# Patient Record
Sex: FEMALE | Race: BLACK OR AFRICAN AMERICAN | NOT HISPANIC OR LATINO | Employment: UNEMPLOYED | URBAN - METROPOLITAN AREA
[De-identification: names, ages, dates, MRNs, and addresses within clinical notes are randomized per-mention and may not be internally consistent; named-entity substitution may affect disease eponyms.]

---

## 2022-12-08 ENCOUNTER — VIRTUAL VISIT (OUTPATIENT)
Dept: FAMILY MEDICINE | Facility: CLINIC | Age: 37
End: 2022-12-08
Payer: COMMERCIAL

## 2022-12-08 DIAGNOSIS — G89.29 CHRONIC RIGHT-SIDED THORACIC BACK PAIN: Primary | ICD-10-CM

## 2022-12-08 DIAGNOSIS — M54.6 CHRONIC RIGHT-SIDED THORACIC BACK PAIN: Primary | ICD-10-CM

## 2022-12-08 PROBLEM — M54.2 CERVICALGIA: Status: ACTIVE | Noted: 2021-09-20

## 2022-12-08 PROBLEM — G44.209 MIXED COMMON MIGRAINE AND MUSCLE CONTRACTION HEADACHE: Status: ACTIVE | Noted: 2019-10-23

## 2022-12-08 PROBLEM — E56.9 VITAMIN DEFICIENCY: Status: ACTIVE | Noted: 2021-09-20

## 2022-12-08 PROBLEM — M27.9 DISORDER OF JAW: Status: ACTIVE | Noted: 2021-09-20

## 2022-12-08 PROBLEM — R73.09 ELEVATED HEMOGLOBIN A1C: Status: ACTIVE | Noted: 2021-07-20

## 2022-12-08 PROBLEM — K21.9 GASTROESOPHAGEAL REFLUX DISEASE: Status: ACTIVE | Noted: 2017-12-27

## 2022-12-08 PROBLEM — G43.019 MIGRAINE WITHOUT AURA, INTRACTABLE, WITHOUT STATUS MIGRAINOSUS: Status: ACTIVE | Noted: 2017-09-18

## 2022-12-08 PROBLEM — M47.812 CERVICAL SPONDYLOSIS: Status: ACTIVE | Noted: 2022-08-01

## 2022-12-08 PROBLEM — G43.009 MIXED COMMON MIGRAINE AND MUSCLE CONTRACTION HEADACHE: Status: ACTIVE | Noted: 2019-10-23

## 2022-12-08 PROBLEM — E03.8 OTHER SPECIFIED HYPOTHYROIDISM: Status: ACTIVE | Noted: 2021-01-05

## 2022-12-08 PROBLEM — I67.1 INTRACRANIAL ANEURYSM: Status: ACTIVE | Noted: 2017-09-18

## 2022-12-08 PROCEDURE — 99203 OFFICE O/P NEW LOW 30 MIN: CPT | Mod: 95 | Performed by: NURSE PRACTITIONER

## 2022-12-08 RX ORDER — GABAPENTIN 100 MG/1
300 CAPSULE ORAL
COMMUNITY
Start: 2022-08-01

## 2022-12-08 RX ORDER — ACETAMINOPHEN 325 MG/1
325-650 TABLET ORAL
COMMUNITY

## 2022-12-08 RX ORDER — CYCLOBENZAPRINE HCL 5 MG
5 TABLET ORAL
COMMUNITY
Start: 2022-08-01

## 2022-12-08 RX ORDER — UBIDECARENONE 100 MG
100 CAPSULE ORAL
COMMUNITY

## 2022-12-08 RX ORDER — HYDROCORTISONE 2.5 %
CREAM (GRAM) TOPICAL
COMMUNITY
Start: 2021-07-26

## 2022-12-08 RX ORDER — NORTRIPTYLINE HCL 10 MG
10 CAPSULE ORAL
COMMUNITY
Start: 2022-10-10 | End: 2023-10-10

## 2022-12-08 RX ORDER — SODIUM FLUORIDE 5 MG/G
GEL, DENTIFRICE DENTAL
COMMUNITY
Start: 2021-07-01

## 2022-12-08 RX ORDER — ESTRADIOL 0.1 MG/G
CREAM VAGINAL
COMMUNITY
Start: 2021-07-23

## 2022-12-08 NOTE — PROGRESS NOTES
Liam is a 37 year old who is being evaluated via a billable telephone visit.      What phone number would you like to be contacted at? [phone]   How would you like to obtain your AVS? Mail    Chronic right-sided thoracic back pain  I apologize that unfortunately I was unable to see her in clinic today as this was scheduled as a virtual appointment.  I have differentials include myofascial pain, bulging or herniated disc.  Will refer to spine clinic for further evaluation and treatment.  She is already taking cyclobenzaprine and gabapentin.  She tried a Medrol Dosepak with no relief.  Recommend over-the-counter acetaminophen or ibuprofen as needed for pain.  I encouraged close follow-up with her PCP if symptoms persist or worsen.  She is content with the plan.  - Spine  Referral        Subjective   Liam is a 37 year old{presenting for the following health issues:  Dizziness and Abdominal Pain (Right side)  Patient presents today with concerns of right-sided thoracic back pain.  She is quite frustrated today that this is a telephone visit.  Patient wants to be seen in person.  Unfortunately, she lives on the other side of the [facility] and this was scheduled as a virtual appointment.  She describes the pain as a constant ache within her right lower scapular region.  Pain is worse at bedtime.  This is been waking her up at night.  She tries to sleep on her side with no relief.  Moving objects exacerbates the pain.  She does have a small child that she carries.  Occasionally, she has numbness and tingling within her feet.  She denies numbness and tingling in her upper extremities.  No rash has been present.  Patient has a history of cervical spondylosis and is prescribed cyclobenzaprine and gabapentin.  She has tried a Medrol Dosepak for her current symptoms with no relief.  Patient has seen other healthcare providers.  She occasionally takes Tylenol.      Review of Systems   Constitutional, HEENT,  cardiovascular, pulmonary, gi and gu systems are negative, except as otherwise noted.      Objective           Vitals:  No vitals were obtained today due to virtual visit.    Physical Exam   healthy, alert and no distress  PSYCH: Alert and oriented times 3; coherent speech, normal   rate and volume, able to articulate logical thoughts, able   to abstract reason, no tangential thoughts, no hallucinations   or delusions  Her affect is anxious and angry  RESP: No cough, no audible wheezing, able to talk in full sentences  Remainder of exam unable to be completed due to telephone visits          Phone call duration: 11 minutes

## 2023-01-04 ENCOUNTER — OFFICE VISIT (OUTPATIENT)
Dept: NEUROSURGERY | Facility: CLINIC | Age: 38
End: 2023-01-04
Attending: NURSE PRACTITIONER
Payer: COMMERCIAL

## 2023-01-04 VITALS
HEIGHT: 64 IN | HEART RATE: 75 BPM | SYSTOLIC BLOOD PRESSURE: 103 MMHG | WEIGHT: 142 LBS | BODY MASS INDEX: 24.24 KG/M2 | DIASTOLIC BLOOD PRESSURE: 67 MMHG | OXYGEN SATURATION: 97 %

## 2023-01-04 DIAGNOSIS — M53.3 CHRONIC SI JOINT PAIN: ICD-10-CM

## 2023-01-04 DIAGNOSIS — R20.0 RIGHT LEG NUMBNESS: ICD-10-CM

## 2023-01-04 DIAGNOSIS — G89.29 CHRONIC SI JOINT PAIN: ICD-10-CM

## 2023-01-04 DIAGNOSIS — G89.29 CHRONIC RIGHT-SIDED THORACIC BACK PAIN: ICD-10-CM

## 2023-01-04 DIAGNOSIS — G89.29 CHRONIC BILATERAL LOW BACK PAIN WITH RIGHT-SIDED SCIATICA: Primary | ICD-10-CM

## 2023-01-04 DIAGNOSIS — M54.6 CHRONIC RIGHT-SIDED THORACIC BACK PAIN: ICD-10-CM

## 2023-01-04 DIAGNOSIS — M54.41 CHRONIC BILATERAL LOW BACK PAIN WITH RIGHT-SIDED SCIATICA: Primary | ICD-10-CM

## 2023-01-04 PROCEDURE — 99243 OFF/OP CNSLTJ NEW/EST LOW 30: CPT | Performed by: NURSE PRACTITIONER

## 2023-01-04 ASSESSMENT — PAIN SCALES - GENERAL: PAINLEVEL: SEVERE PAIN (7)

## 2023-01-04 NOTE — LETTER
1/4/2023         RE: Liam Cintron  [address]  [place] [address]        Dear Colleague,    Thank you for referring your patient, Liam Cintron, to the [facility] NEUROLOGY [facility]INICS [facility]. Please see a copy of my visit note below.    [facility] Neurosurgery  Neurosurgery Clinic Visit      CC: mid and low back pain with right leg pain and paresthesias.     Primary care Provider: No Ref-Primary, Physician    Reason For Visit:   I was asked by Wanda Camara CNP to consult on the patient for chronic right-sided thoracic back pain.    HPI: Liam Cintron is a 38 year old female with a history of chronic mid and low back pain who presents for a second opinion. She describes right-sided mid back pain just below her shoulder blade. She also reports bilateral low back pain that intermittently radiates to her right ankle/foot with right ankle/foot paresthesias. She reports generalized weakness. No overt weakness. No bowel/bladder complaints. She has not had any recent treatments. She has had full spine MRIs less than a year ago which were negative.     No past medical history on file.    Past Medical History reviewed with patient during visit.    No past surgical history on file.  Past Surgical History reviewed with patient during visit.    Current Outpatient Medications   Medication     acetaminophen (TYLENOL) 325 MG tablet     cholecalciferol 25 MCG (1000 UT) TABS     co-enzyme Q-10 100 MG CAPS capsule     cyclobenzaprine (FLEXERIL) 5 MG tablet     estradiol (ESTRACE) 0.1 MG/GM vaginal cream     gabapentin (NEURONTIN) 100 MG capsule     hydrocortisone 2.5 % cream     nortriptyline (PAMELOR) 10 MG capsule     omeprazole (PRILOSEC) 20 MG DR capsule     sodium fluoride dental gel (PREVIDENT) 1.1 % GEL topical gel     No current facility-administered medications for this visit.       No Known Allergies    Social History     Socioeconomic History     Marital status: Single       No family  "history on file.      ROS: 10 point ROS neg other than the symptoms noted above in the HPI.    Vital Signs:   /67   Pulse 75   Ht 5' 4\" (1.626 m)   Wt 142 lb (64.4 kg)   SpO2 97%   BMI 24.37 kg/m        Examination:  Constitutional:  Alert, well nourished, NAD.  Memory: recent and remote memory   HEENT: Normocephalic, atraumatic.   Pulm:  Without shortness of breath   CV:  No pitting edema of BLE.      Neurological:  Awake  Alert  Oriented x 3  Speech clear    Motor exam:   Hip Flexion:                 Right: 5/5  Left:  5/5  Hip Abduction:             Right:  5/5  Left:  5/5  Hip Adduction:             Right:  5/5  Left:  5/5  Plantar Flexion:           Right:  5/5  Left:  5/5  Dorsal Flexion:            Right:  5/5  Left:  5/5  EHL:                            Right:  5/5  Left:  5/5     Sensation normal to bilateral upper and lower extremities  Muscle tone to bilateral upper and lower extremities   Gait: Able to stand from a seated position. Normal non-antalgic, non-myelopathic gait.  Able to heel/toe walk without loss of balance    Thoracic and lumbar examination reveals mild tenderness of the paraspinous muscles.  Hip height is symmetrical. Positive bilateral SI joint. Negative sciatic notch or greater trochanteric tenderness to palpation bilaterally.  Straight leg raise is negative bilaterally.      Imaging:   Cervical MRI 2/28/2022  IMPRESSION:     1. No acute fracture or spondylolisthesis.   2. No significant spinal canal stenosis or neural foraminal narrowing.     Thoracic MRI 2/28/2022  IMPRESSION:     1. No acute fracture or spondylolisthesis.   2. Tiny disc bulges at T5-6 and T7-8 contact the ventral spinal cord without significant spinal canal stenosis.     Lumbar MRI 2/28/2022  IMPRESSION:     1. No acute fracture or spondylolisthesis.   2. No significant spinal canal stenosis or neural foraminal narrowing.     Assessment/Plan:   Chronic right-sided mid back pain  Bilateral low back pain with " right-sided sciatica  Right leg paresthesias  Chronic bilateral SI joint pain    Reviewed prior imaging. Would recommend PT at this time. Discuss bilateral SI joint injection for diagnostic/therapeutic purposes. She would like to hold off on that at this time. Will follow up in 6 weeks if symptoms persist/worsen and would likely recommend MRI imaging at that time. She verbalized understanding and agreement.    Patient Instructions   -Physical therapy ordered. They will contact you to schedule.  -Follow up in 6 weeks if symptoms persist or worsen.  -Please contact our clinic with questions or concerns at [phone].      Jeaneth Dodge CNP  [facility] Neurosurgery  [address] [place] [id number]  Tel [phone]  Fax [phone]        Again, thank you for allowing me to participate in the care of your patient.        Sincerely,        Jeaneth Dodge, NP

## 2023-01-04 NOTE — PATIENT INSTRUCTIONS
-Physical therapy ordered. They will contact you to schedule.  -Follow up in 6 weeks if symptoms persist or worsen.  -Please contact our clinic with questions or concerns at [phone].

## 2023-01-04 NOTE — NURSING NOTE
"Liam Cintron is a 38 year old female who presents for:  Chief Complaint   Patient presents with     Consult     Chronic right side Thoracic  pain, some pain in both feet, right side mostly        Initial Vitals:  /67   Pulse 75   Ht 5' 4\" (1.626 m)   Wt 142 lb (64.4 kg)   SpO2 97%   BMI 24.37 kg/m   Estimated body mass index is 24.37 kg/m  as calculated from the following:    Height as of this encounter: 5' 4\" (1.626 m).    Weight as of this encounter: 142 lb (64.4 kg).. Body surface area is 1.71 meters squared. BP completed using cuff size: small regular  Severe Pain (7)        Ama Hayes  "

## 2023-01-04 NOTE — PROGRESS NOTES
"[facility] Neurosurgery  Neurosurgery Clinic Visit      CC: mid and low back pain with right leg pain and paresthesias.     Primary care Provider: No Ref-Primary, Physician    Reason For Visit:   I was asked by Wanda Camara CNP to consult on the patient for chronic right-sided thoracic back pain.    HPI: Liam Cintron is a 38 year old female with a history of chronic mid and low back pain who presents for a second opinion. She describes right-sided mid back pain just below her shoulder blade. She also reports bilateral low back pain that intermittently radiates to her right ankle/foot with right ankle/foot paresthesias. She reports generalized weakness. No overt weakness. No bowel/bladder complaints. She has not had any recent treatments. She has had full spine MRIs less than a year ago which were negative.     No past medical history on file.    Past Medical History reviewed with patient during visit.    No past surgical history on file.  Past Surgical History reviewed with patient during visit.    Current Outpatient Medications   Medication     acetaminophen (TYLENOL) 325 MG tablet     cholecalciferol 25 MCG (1000 UT) TABS     co-enzyme Q-10 100 MG CAPS capsule     cyclobenzaprine (FLEXERIL) 5 MG tablet     estradiol (ESTRACE) 0.1 MG/GM vaginal cream     gabapentin (NEURONTIN) 100 MG capsule     hydrocortisone 2.5 % cream     nortriptyline (PAMELOR) 10 MG capsule     omeprazole (PRILOSEC) 20 MG DR capsule     sodium fluoride dental gel (PREVIDENT) 1.1 % GEL topical gel     No current facility-administered medications for this visit.       No Known Allergies    Social History     Socioeconomic History     Marital status: Single       No family history on file.      ROS: 10 point ROS neg other than the symptoms noted above in the HPI.    Vital Signs:   /67   Pulse 75   Ht 5' 4\" (1.626 m)   Wt 142 lb (64.4 kg)   SpO2 97%   BMI 24.37 kg/m        Examination:  Constitutional:  Alert, well nourished, " NAD.  Memory: recent and remote memory   HEENT: Normocephalic, atraumatic.   Pulm:  Without shortness of breath   CV:  No pitting edema of BLE.      Neurological:  Awake  Alert  Oriented x 3  Speech clear    Motor exam:   Hip Flexion:                 Right: 5/5  Left:  5/5  Hip Abduction:             Right:  5/5  Left:  5/5  Hip Adduction:             Right:  5/5  Left:  5/5  Plantar Flexion:           Right:  5/5  Left:  5/5  Dorsal Flexion:            Right:  5/5  Left:  5/5  EHL:                            Right:  5/5  Left:  5/5     Sensation normal to bilateral upper and lower extremities  Muscle tone to bilateral upper and lower extremities   Gait: Able to stand from a seated position. Normal non-antalgic, non-myelopathic gait.  Able to heel/toe walk without loss of balance    Thoracic and lumbar examination reveals mild tenderness of the paraspinous muscles.  Hip height is symmetrical. Positive bilateral SI joint. Negative sciatic notch or greater trochanteric tenderness to palpation bilaterally.  Straight leg raise is negative bilaterally.      Imaging:   Cervical MRI 2/28/2022  IMPRESSION:     1. No acute fracture or spondylolisthesis.   2. No significant spinal canal stenosis or neural foraminal narrowing.     Thoracic MRI 2/28/2022  IMPRESSION:     1. No acute fracture or spondylolisthesis.   2. Tiny disc bulges at T5-6 and T7-8 contact the ventral spinal cord without significant spinal canal stenosis.     Lumbar MRI 2/28/2022  IMPRESSION:     1. No acute fracture or spondylolisthesis.   2. No significant spinal canal stenosis or neural foraminal narrowing.     Assessment/Plan:   Chronic right-sided mid back pain  Bilateral low back pain with right-sided sciatica  Right leg paresthesias  Chronic bilateral SI joint pain    Reviewed prior imaging. Would recommend PT at this time. Discuss bilateral SI joint injection for diagnostic/therapeutic purposes. She would like to hold off on that at this time. Will  follow up in 6 weeks if symptoms persist/worsen and would likely recommend MRI imaging at that time. She verbalized understanding and agreement.    Patient Instructions   -Physical therapy ordered. They will contact you to schedule.  -Follow up in 6 weeks if symptoms persist or worsen.  -Please contact our clinic with questions or concerns at [phone].      Jeaneth Dodge, [facility] Neurosurgery  [address] [place] [id number]  Tel [phone]  Fax [phone]

## 2023-01-16 ENCOUNTER — THERAPY VISIT (OUTPATIENT)
Dept: PHYSICAL THERAPY | Facility: CLINIC | Age: 38
End: 2023-01-16
Attending: NURSE PRACTITIONER
Payer: COMMERCIAL

## 2023-01-16 DIAGNOSIS — G89.29 CHRONIC BILATERAL LOW BACK PAIN WITH BILATERAL SCIATICA: ICD-10-CM

## 2023-01-16 DIAGNOSIS — M54.41 CHRONIC BILATERAL LOW BACK PAIN WITH BILATERAL SCIATICA: ICD-10-CM

## 2023-01-16 DIAGNOSIS — G89.29 CHRONIC SI JOINT PAIN: ICD-10-CM

## 2023-01-16 DIAGNOSIS — M53.3 CHRONIC SI JOINT PAIN: ICD-10-CM

## 2023-01-16 DIAGNOSIS — G89.29 CHRONIC BILATERAL LOW BACK PAIN WITH RIGHT-SIDED SCIATICA: ICD-10-CM

## 2023-01-16 DIAGNOSIS — M54.41 CHRONIC BILATERAL LOW BACK PAIN WITH RIGHT-SIDED SCIATICA: ICD-10-CM

## 2023-01-16 DIAGNOSIS — R20.0 RIGHT LEG NUMBNESS: ICD-10-CM

## 2023-01-16 DIAGNOSIS — M54.42 CHRONIC BILATERAL LOW BACK PAIN WITH BILATERAL SCIATICA: ICD-10-CM

## 2023-01-16 DIAGNOSIS — M54.6 CHRONIC RIGHT-SIDED THORACIC BACK PAIN: ICD-10-CM

## 2023-01-16 DIAGNOSIS — G89.29 CHRONIC RIGHT-SIDED THORACIC BACK PAIN: ICD-10-CM

## 2023-01-16 PROCEDURE — 97110 THERAPEUTIC EXERCISES: CPT | Mod: GP | Performed by: PHYSICAL THERAPIST

## 2023-01-16 PROCEDURE — 97162 PT EVAL MOD COMPLEX 30 MIN: CPT | Mod: GP | Performed by: PHYSICAL THERAPIST

## 2023-01-16 NOTE — PROGRESS NOTES
[facility]    OUTPATIENT Physical Therapy ORTHOPEDIC EVALUATION  PLAN OF TREATMENT FOR OUTPATIENT REHABILITATION  (COMPLETE FOR INITIAL CLAIMS ONLY)  Patient's Last Name, First Name, M.I.  YOB: 1985  Liam Cintron    Provider s Name:  [facility]   Medical Record No.  [id number]   Start of Care Date:  01/16/23   Onset Date:   12/06/22   Treatment Diagnosis:  LBP w/B LE sciatica Medical Diagnosis:     Chronic right-sided thoracic back pain  Chronic bilateral low back pain with right-sided sciatica  Right leg numbness  Chronic SI joint pain  Chronic bilateral low back pain with bilateral sciatica       Goals:     01/16/23 0500   Body Part   Goals listed below are for LBP w/R>L sciatica   Goal #1   Goal #1 self cares/transfers/bed mobility   Previous Functional Level No restrictions   Current Functional Level to transfer in and out of a bed   Performance Level 9/10 concordant LBP   STG Target Performance Be able to ; transfer in and out of a bed   Performance Level 4-5/10 concordant LBP   Rationale for independent self care such as dressing, personal hygiene, bathing;for independent community transportation;for independent living   Due Date 02/06/23   LTG Target Performance Be able to ; transfer in and out of a bed   Performance level 0-2/10 concorandant LBP   Rationale independence in self cares;for independent living;for independent community transportation   Due Date 03/13/23         Therapy Frequency:  1x/week  Predicted Duration of Therapy Intervention:  6 weeks    Jeaneth Arreola PT                 I CERTIFY THE NEED FOR THESE SERVICES FURNISHED UNDER        THIS PLAN OF TREATMENT AND WHILE UNDER MY CARE     (Physician attestation of this document indicates review and certification of the therapy plan).                     Certification Date From:  01/16/23    Certification Date To:  04/15/23    Referring Provider:  Jeaneth Dodge    Initial Assessment        See Epic Evaluation SOC Date: 01/16/23

## 2023-01-16 NOTE — PROGRESS NOTES
Physical Therapy Initial Evaluation  Subjective:  Pt has a 20 month old baby.  Pt has had chronic neck/B shoulder pain for 10 years - it is still there.    The history is provided by the patient. No  was used.   Patient Health History  Liam Cintron being seen for back pain.     Problem began: 12/16/2022.   Problem occurred: insiduous   Pain is reported as 7/10 on pain scale.  General health as reported by patient is good.  Pertinent medical history includes: none.   Red flags:  None as reported by patient.  Medical allergies: none.   Surgeries include:  None.    Current medications:  None.    Current occupation is none.                     Therapist Generated HPI Evaluation         Type of problem:  Lumbar.    This is a chronic condition.  Condition occurred with:  Insidious onset.  Where condition occurred: for unknown reasons.  Patient reports pain:  Lumbar spine right and lumbar spine left.  Pain is described as sharp and shooting and is intermittent.  Pain radiates to:  Foot right, gluteals left, thigh right, lower leg right, gluteals right, thigh left and lower leg left (B anterior pelvis/groin, R heel/toes/arch numbness/tingling intermittentl.  Pain goes down entire leg (R>L). - intermittently). Pain is worse in the P.M., worse during the night and worse in the A.M..  Since onset symptoms are gradually worsening (originally just on the R side of mid and low back - but pain was primarily only at night with sidesleeping).  Associated symptoms:  Loss of motion/stiffness (stiffness/tightness in R leg). Symptoms are exacerbated by lying down, sitting, standing, lifting and walking (transfers - very hard to go from sit to stand and walk because bent over for a whil)  Relieved by: not taking any pain meds for the past 2 weeks.  Special tests included:  MRI (pt reports that she has a disc bulge in her lumbar spine).    Barriers include:  None as reported by patient.                         Objective:  System    Physical Exam      [facility] Lumbar Evaluation    Posture:  Sitting: fair  Standing: fair  Lordosis: Accentuated  Lateral Shift: no  Correction of Posture: better    Movement Loss:  Flexion (Flex): min  Extension (EXT): major and pain  Side Tillson R (SG R): min  Side Glide L (SG L): min  Test Movements:      MAC: During: increases  After: no worse  Mechanical Response: no effect  Repeat MAC: During: increases  After: no worse  Mechanical Response: no effect  EIL: During: abolishes  After: worse and peripheralizing  Mechanical Response: DecrROM  Repeat EIL: During: abolishes  After: worse and peripheralizing  Mechanical Response: DecrROM        Conclusion: other  Principle of Treatment:  Posture Correction: recommended use of lumbar roll whenever sitting  Flexion: trial of supine repeated flexion in lying x 10-15 repetitions, 3-4 times/day                                             ROS    Assessment/Plan:    Patient is a 38 year old female with lumbar complaints.    Patient has the following significant findings with corresponding treatment plan.                Diagnosis 1:  LBP w/B sciatica (L>R)  Pain -  directional preference exercise and home program  Decreased ROM/flexibility - manual therapy, therapeutic exercise and home program  Decreased function - therapeutic activities and home program  Impaired posture - neuro re-education and home program    Therapy Evaluation Codes:   1) History comprised of:   Personal factors that impact the plan of care:      Time since onset of symptoms.    Comorbidity factors that impact the plan of care are:      Numbness/tingling and Pain at night/rest.     Medications impacting care: none.  2) Examination of Body Systems comprised of:   Body structures and functions that impact the plan of care:      Cervical spine and Lumbar spine.   Activity limitations that impact the plan of care are:      Lifting, Sitting, Standing, Walking and  Sleeping.  3) Clinical presentation characteristics are:   Evolving/Changing.  4) Decision-Making    High complexity using standardized patient assessment instrument and/or measureable assessment of functional outcome.  Cumulative Therapy Evaluation is: Moderate complexity.    Previous and current functional limitations:  (See Goal Flow Sheet for this information)    Short term and Long term goals: (See Goal Flow Sheet for this information)     Communication ability:  Patient appears to be able to clearly communicate and understand verbal and written communication and follow directions correctly.  Treatment Explanation - The following has been discussed with the patient:   RX ordered/plan of care  Anticipated outcomes  Possible risks and side effects  This patient would benefit from PT intervention to resume normal activities.   Rehab potential is good.    Frequency:  1 X week, once daily  Duration:  for 8 weeks  Discharge Plan:  Achieve all LTG.  Independent in home treatment program.  Reach maximal therapeutic benefit.    Please refer to the daily flowsheet for treatment today, total treatment time and time spent performing 1:1 timed codes.

## 2023-01-23 ENCOUNTER — THERAPY VISIT (OUTPATIENT)
Dept: PHYSICAL THERAPY | Facility: CLINIC | Age: 38
End: 2023-01-23
Attending: NURSE PRACTITIONER
Payer: COMMERCIAL

## 2023-01-23 DIAGNOSIS — G89.29 CHRONIC BILATERAL LOW BACK PAIN WITH BILATERAL SCIATICA: Primary | ICD-10-CM

## 2023-01-23 DIAGNOSIS — M54.42 CHRONIC BILATERAL LOW BACK PAIN WITH BILATERAL SCIATICA: Primary | ICD-10-CM

## 2023-01-23 DIAGNOSIS — M54.41 CHRONIC BILATERAL LOW BACK PAIN WITH BILATERAL SCIATICA: Primary | ICD-10-CM

## 2023-01-23 PROCEDURE — 97110 THERAPEUTIC EXERCISES: CPT | Mod: GP | Performed by: PHYSICAL THERAPIST

## 2023-01-30 ENCOUNTER — THERAPY VISIT (OUTPATIENT)
Dept: PHYSICAL THERAPY | Facility: CLINIC | Age: 38
End: 2023-01-30
Attending: NURSE PRACTITIONER
Payer: COMMERCIAL

## 2023-01-30 DIAGNOSIS — G89.29 CHRONIC BILATERAL LOW BACK PAIN WITH BILATERAL SCIATICA: Primary | ICD-10-CM

## 2023-01-30 DIAGNOSIS — M54.41 CHRONIC BILATERAL LOW BACK PAIN WITH BILATERAL SCIATICA: Primary | ICD-10-CM

## 2023-01-30 DIAGNOSIS — M54.42 CHRONIC BILATERAL LOW BACK PAIN WITH BILATERAL SCIATICA: Primary | ICD-10-CM

## 2023-01-30 PROCEDURE — 97112 NEUROMUSCULAR REEDUCATION: CPT | Mod: GP | Performed by: PHYSICAL THERAPIST

## 2023-01-30 PROCEDURE — 97110 THERAPEUTIC EXERCISES: CPT | Mod: GP | Performed by: PHYSICAL THERAPIST

## 2023-01-30 NOTE — PROGRESS NOTES
PROGRESS  REPORT    Progress reporting period is from 1/16/2023  to 1/30/2023.       SUBJECTIVE  Subjective changes noted by patient:  Pt reports that she still has a lof of pain with bending and with going from sit to stand.  She gets pain in her lateral R leg and at R glut.  She still has constant numbness/tingling at R heel/toes (gets it much more intermittently on L side).  The pain is still a 9/10 when trying to get out of bed in the morning.  Overall, no change in her symptoms/function.  Changes in function:  None  Adverse reaction to treatment or activity: activity - transitional movements - getting out of bed in the morning and going from sit to stand after prolonged sitting.    OBJECTIVE  Changes noted in objective findings:  Yes, worsening of L5 myotomal strength for R LE.  Objective: Current pain is mild at R lower back/glut and R lateral leg - no numbness in heel/foot.  MMT R DF 5/5, R great to 3/5,difficulty with going up on toes to do toe walking and with DL toe raises.  After supine lumbar flexion in lying stretch, decreased lumbar pain initially, but overall no change upone standing.  Prone positioning and prone lumbar extension stretches produce peripheralized pain into R LE and some numbness/tingling into foot/toes.  lumbar flexion/rotation to the right has NE on pain.  In summary, unable to find a directional preference to try to improve  radicular symptoms.  Worsening.of L5 myotomal strength and difficulty with doing DL toe raises today.     ASSESSMENT/PLAN  Updated problem list and treatment plan: Diagnosis 1:  LBP   Pain -  directional preference exercise and home program  Decreased ROM/flexibility - therapeutic exercise and home program  Impaired muscle performance - neuro re-education and home program  Decreased function - therapeutic activities and home program  Impaired posture - neuro re-education and home program  STG/LTGs have been met or progress has been made towards goals:   None  Assessment of Progress: No change in function and possible worsening of myotomal strength in R LE.  Self Management Plans:  Patient has been instructed in a home treatment program.  I have re-evaluated this patient and find that the nature, scope, duration and intensity of the therapy is appropriate for the medical condition of the patient.  Liam continues to require the following intervention to meet STG and LTG's:  Referring pt back to MD>    Recommendations:  This patient would benefit from further evaluation.    Please refer to the daily flowsheet for treatment today, total treatment time and time spent performing 1:1 timed codes.

## 2023-01-31 ENCOUNTER — TELEPHONE (OUTPATIENT)
Dept: NEUROSURGERY | Facility: CLINIC | Age: 38
End: 2023-01-31
Payer: COMMERCIAL

## 2023-01-31 NOTE — TELEPHONE ENCOUNTER
Patient called wanting to see Jeaneth Dodge- she was referred back to the clinic from the physical therapist as therapy is not working.  Provider not available in [facility] until end of March. Patient stated that was too long to wait.

## 2023-02-10 ENCOUNTER — OFFICE VISIT (OUTPATIENT)
Dept: NEUROSURGERY | Facility: CLINIC | Age: 38
End: 2023-02-10
Attending: PHYSICIAN ASSISTANT
Payer: COMMERCIAL

## 2023-02-10 VITALS
SYSTOLIC BLOOD PRESSURE: 91 MMHG | DIASTOLIC BLOOD PRESSURE: 54 MMHG | HEIGHT: 64 IN | HEART RATE: 67 BPM | OXYGEN SATURATION: 96 % | BODY MASS INDEX: 24.24 KG/M2 | WEIGHT: 142 LBS

## 2023-02-10 DIAGNOSIS — G89.29 CHRONIC BILATERAL LOW BACK PAIN WITH RIGHT-SIDED SCIATICA: Primary | ICD-10-CM

## 2023-02-10 DIAGNOSIS — M54.41 CHRONIC BILATERAL LOW BACK PAIN WITH RIGHT-SIDED SCIATICA: Primary | ICD-10-CM

## 2023-02-10 PROCEDURE — G0463 HOSPITAL OUTPT CLINIC VISIT: HCPCS | Performed by: PHYSICIAN ASSISTANT

## 2023-02-10 PROCEDURE — 99213 OFFICE O/P EST LOW 20 MIN: CPT | Performed by: PHYSICIAN ASSISTANT

## 2023-02-10 ASSESSMENT — PAIN SCALES - GENERAL: PAINLEVEL: EXTREME PAIN (8)

## 2023-02-10 NOTE — NURSING NOTE
"Liam Cintron is a 38 year old female who presents for:  Chief Complaint   Patient presents with     Consult     Low back pain        Initial Vitals:  BP 91/54   Pulse 67   Ht 5' 4\" (1.626 m)   Wt 142 lb (64.4 kg)   SpO2 96%   BMI 24.37 kg/m   Estimated body mass index is 24.37 kg/m  as calculated from the following:    Height as of this encounter: 5' 4\" (1.626 m).    Weight as of this encounter: 142 lb (64.4 kg).. Body surface area is 1.71 meters squared. BP completed using cuff size: regular  Extreme Pain (8)    Nursing Comments:     Byron Villasenor  "

## 2023-02-10 NOTE — LETTER
2/10/2023         RE: Liam Cintron  [address]  [place] [facility] [address]        Dear Colleague,    Thank you for referring your patient, Liam Cintron, to the [facility] [place]. Please see a copy of my visit note below.    Neurosurgery follow-up    Ms. Cintron is a 38-year-old female who returns to clinic for evaluation of left-sided back pain in the scapular region and thoracic region, as well as bilateral lumbar back pain around L4-5 she radiates anteriorly around her abdomen.  She reports occasional symptoms going down her right leg.  She has had multiple lumbar MRIs and thoracic and cervical MRIs have all been unremarkable.  She has done physical therapy recently without improvement.  She has not tried chiropractic and acupuncture.  She reports right lower quadrant pain.  She recently had a right upper quadrant ultrasound which was negative.    Exam    B/P: 91/54, T: Data Unavailable, P: 67, R: Data Unavailable     Alert and oriented no acute distress  Bilateral upper extremities with 5/5 strength  Samantha sign negative  Reflexes 2+ triceps, biceps, brachioradialis    Bilateral lower extremities with 5/5 strength  Reflexes 2+ patella/ankle  Negative straight leg raise bilaterally  Negative ankle clonus negative Babinski bilaterally  Gait is normal    Thoracic and lumbar spine nontender to palpation in the midline, she has tenderness to palpation bilaterally in the L4-5 region in the paraspinal muscles    Abdomen is moderately tender to palpation in the right lower quadrant    Imaging    No recent imaging to review  Cervical thoracic and lumbar MRI from a year ago are unremarkable for high-grade central or foraminal stenosis.  No high-grade degenerative changes.    Assessment    Left-sided thoracic back pain bilateral lumbar back pain  Right lower quadrant pain      Plan    I recommend the patient follow-up with her primary care provider regarding her  right lower quadrant pain, for her back pain I would recommend that she try chiropractic and acupuncture and I provided her with a referral to a practitioner in [place] that I think may be beneficial for her.  If she is not improving with that therapy she should follow-up for further evaluation.    She had been evaluated by neurology as well in the past, she apparently had an EMG which was normal however not clear what the EMG was exactly focusing on, exam not able to find record, it was apparently done in 2021.  She also has follow-up with neurology for brain aneurysm when she gets MRIs for annually.              Again, thank you for allowing me to participate in the care of your patient.        Sincerely,        Micaela Wilhelm PA-C

## 2023-02-10 NOTE — PROGRESS NOTES
Neurosurgery follow-up    Ms. Cintron is a 38-year-old female who returns to clinic for evaluation of left-sided back pain in the scapular region and thoracic region, as well as bilateral lumbar back pain around L4-5 she radiates anteriorly around her abdomen.  She reports occasional symptoms going down her right leg.  She has had multiple lumbar MRIs and thoracic and cervical MRIs have all been unremarkable.  She has done physical therapy recently without improvement.  She has not tried chiropractic and acupuncture.  She reports right lower quadrant pain.  She recently had a right upper quadrant ultrasound which was negative.    Exam    B/P: 91/54, T: Data Unavailable, P: 67, R: Data Unavailable     Alert and oriented no acute distress  Bilateral upper extremities with 5/5 strength  Samantha sign negative  Reflexes 2+ triceps, biceps, brachioradialis    Bilateral lower extremities with 5/5 strength  Reflexes 2+ patella/ankle  Negative straight leg raise bilaterally  Negative ankle clonus negative Babinski bilaterally  Gait is normal    Thoracic and lumbar spine nontender to palpation in the midline, she has tenderness to palpation bilaterally in the L4-5 region in the paraspinal muscles    Abdomen is moderately tender to palpation in the right lower quadrant    Imaging    No recent imaging to review  Cervical thoracic and lumbar MRI from a year ago are unremarkable for high-grade central or foraminal stenosis.  No high-grade degenerative changes.    Assessment    Left-sided thoracic back pain bilateral lumbar back pain  Right lower quadrant pain      Plan    I recommend the patient follow-up with her primary care provider regarding her right lower quadrant pain, for her back pain I would recommend that she try chiropractic and acupuncture and I provided her with a referral to a practitioner in [place] that I think may be beneficial for her.  If she is not improving with that therapy she should follow-up for  further evaluation.    She had been evaluated by neurology as well in the past, she apparently had an EMG which was normal however not clear what the EMG was exactly focusing on, exam not able to find record, it was apparently done in 2021.  She also has follow-up with neurology for brain aneurysm when she gets MRIs for annually.

## 2023-08-05 ENCOUNTER — APPOINTMENT (OUTPATIENT)
Dept: ULTRASOUND IMAGING | Facility: CLINIC | Age: 38
End: 2023-08-05
Attending: EMERGENCY MEDICINE
Payer: COMMERCIAL

## 2023-08-05 ENCOUNTER — HOSPITAL ENCOUNTER (EMERGENCY)
Facility: CLINIC | Age: 38
Discharge: HOME | End: 2023-08-05
Attending: EMERGENCY MEDICINE | Admitting: EMERGENCY MEDICINE
Payer: COMMERCIAL

## 2023-08-05 VITALS
DIASTOLIC BLOOD PRESSURE: 66 MMHG | TEMPERATURE: 98 F | RESPIRATION RATE: 18 BRPM | HEIGHT: 64 IN | OXYGEN SATURATION: 99 % | WEIGHT: 147 LBS | HEART RATE: 62 BPM | SYSTOLIC BLOOD PRESSURE: 110 MMHG | BODY MASS INDEX: 25.1 KG/M2

## 2023-08-05 DIAGNOSIS — M54.50 ACUTE LOW BACK PAIN, UNSPECIFIED BACK PAIN LATERALITY, UNSPECIFIED WHETHER SCIATICA PRESENT: ICD-10-CM

## 2023-08-05 DIAGNOSIS — N83.202 LEFT OVARIAN CYST: ICD-10-CM

## 2023-08-05 LAB
ALBUMIN SERPL BCG-MCNC: 3.7 G/DL (ref 3.5–5.2)
ALBUMIN UR-MCNC: NEGATIVE MG/DL
ALP SERPL-CCNC: 64 U/L (ref 35–104)
ALT SERPL W P-5'-P-CCNC: 5 U/L (ref 0–50)
ANION GAP SERPL CALCULATED.3IONS-SCNC: 11 MMOL/L (ref 7–15)
APPEARANCE UR: CLEAR
AST SERPL W P-5'-P-CCNC: 14 U/L (ref 0–45)
BASOPHILS # BLD AUTO: 0.1 10E3/UL (ref 0–0.2)
BASOPHILS NFR BLD AUTO: 0 %
BILIRUB SERPL-MCNC: 0.3 MG/DL
BILIRUB UR QL STRIP: NEGATIVE
BUN SERPL-MCNC: 14.2 MG/DL (ref 6–20)
CALCIUM SERPL-MCNC: 8.6 MG/DL (ref 8.6–10)
CHLORIDE SERPL-SCNC: 103 MMOL/L (ref 98–107)
COLOR UR AUTO: ABNORMAL
CREAT SERPL-MCNC: 0.66 MG/DL (ref 0.51–0.95)
DEPRECATED HCO3 PLAS-SCNC: 22 MMOL/L (ref 22–29)
EOSINOPHIL # BLD AUTO: 0.2 10E3/UL (ref 0–0.7)
EOSINOPHIL NFR BLD AUTO: 1 %
ERYTHROCYTE [DISTWIDTH] IN BLOOD BY AUTOMATED COUNT: 13.9 % (ref 10–15)
GFR SERPL CREATININE-BSD FRML MDRD: >90 ML/MIN/1.73M2
GLUCOSE SERPL-MCNC: 107 MG/DL (ref 70–99)
GLUCOSE UR STRIP-MCNC: NEGATIVE MG/DL
HCG UR QL: NEGATIVE
HCT VFR BLD AUTO: 36.5 % (ref 35–47)
HGB BLD-MCNC: 11.7 G/DL (ref 11.7–15.7)
HGB UR QL STRIP: ABNORMAL
IMM GRANULOCYTES # BLD: 0 10E3/UL
IMM GRANULOCYTES NFR BLD: 0 %
KETONES UR STRIP-MCNC: NEGATIVE MG/DL
LEUKOCYTE ESTERASE UR QL STRIP: NEGATIVE
LIPASE SERPL-CCNC: 31 U/L (ref 13–60)
LYMPHOCYTES # BLD AUTO: 3.4 10E3/UL (ref 0.8–5.3)
LYMPHOCYTES NFR BLD AUTO: 29 %
MCH RBC QN AUTO: 29.4 PG (ref 26.5–33)
MCHC RBC AUTO-ENTMCNC: 32.1 G/DL (ref 31.5–36.5)
MCV RBC AUTO: 92 FL (ref 78–100)
MONOCYTES # BLD AUTO: 0.9 10E3/UL (ref 0–1.3)
MONOCYTES NFR BLD AUTO: 8 %
NEUTROPHILS # BLD AUTO: 7.3 10E3/UL (ref 1.6–8.3)
NEUTROPHILS NFR BLD AUTO: 62 %
NITRATE UR QL: NEGATIVE
NRBC # BLD AUTO: 0 10E3/UL
NRBC BLD AUTO-RTO: 0 /100
PH UR STRIP: 6 [PH] (ref 5–7)
PLATELET # BLD AUTO: 198 10E3/UL (ref 150–450)
POTASSIUM SERPL-SCNC: 3.7 MMOL/L (ref 3.4–5.3)
PROT SERPL-MCNC: 6.7 G/DL (ref 6.4–8.3)
RBC # BLD AUTO: 3.98 10E6/UL (ref 3.8–5.2)
RBC URINE: 1 /HPF
SODIUM SERPL-SCNC: 136 MMOL/L (ref 136–145)
SP GR UR STRIP: 1.02 (ref 1–1.03)
SQUAMOUS EPITHELIAL: 1 /HPF
UROBILINOGEN UR STRIP-MCNC: NORMAL MG/DL
WBC # BLD AUTO: 11.8 10E3/UL (ref 4–11)
WBC URINE: 1 /HPF

## 2023-08-05 PROCEDURE — 81001 URINALYSIS AUTO W/SCOPE: CPT | Performed by: EMERGENCY MEDICINE

## 2023-08-05 PROCEDURE — 81025 URINE PREGNANCY TEST: CPT | Performed by: EMERGENCY MEDICINE

## 2023-08-05 PROCEDURE — 80053 COMPREHEN METABOLIC PANEL: CPT | Performed by: EMERGENCY MEDICINE

## 2023-08-05 PROCEDURE — 99284 EMERGENCY DEPT VISIT MOD MDM: CPT | Mod: 25

## 2023-08-05 PROCEDURE — 83690 ASSAY OF LIPASE: CPT | Performed by: EMERGENCY MEDICINE

## 2023-08-05 PROCEDURE — 85025 COMPLETE CBC W/AUTO DIFF WBC: CPT | Performed by: EMERGENCY MEDICINE

## 2023-08-05 PROCEDURE — 250N000013 HC RX MED GY IP 250 OP 250 PS 637: Performed by: EMERGENCY MEDICINE

## 2023-08-05 PROCEDURE — 99285 EMERGENCY DEPT VISIT HI MDM: CPT | Mod: 25

## 2023-08-05 PROCEDURE — 36415 COLL VENOUS BLD VENIPUNCTURE: CPT | Performed by: EMERGENCY MEDICINE

## 2023-08-05 PROCEDURE — 76830 TRANSVAGINAL US NON-OB: CPT

## 2023-08-05 RX ORDER — METHYLPREDNISOLONE 4 MG
4 TABLET, DOSE PACK ORAL SEE ADMIN INSTRUCTIONS
Qty: 21 TABLET | Refills: 0 | Status: SHIPPED | OUTPATIENT
Start: 2023-08-05

## 2023-08-05 RX ORDER — OXYCODONE AND ACETAMINOPHEN 5; 325 MG/1; MG/1
1 TABLET ORAL ONCE
Status: COMPLETED | OUTPATIENT
Start: 2023-08-05 | End: 2023-08-05

## 2023-08-05 RX ORDER — DIAZEPAM 5 MG
5 TABLET ORAL ONCE
Status: COMPLETED | OUTPATIENT
Start: 2023-08-05 | End: 2023-08-05

## 2023-08-05 RX ORDER — KETOROLAC TROMETHAMINE 15 MG/ML
10 INJECTION, SOLUTION INTRAMUSCULAR; INTRAVENOUS ONCE
Status: COMPLETED | OUTPATIENT
Start: 2023-08-05 | End: 2023-08-05

## 2023-08-05 RX ORDER — CYCLOBENZAPRINE HCL 10 MG
10 TABLET ORAL 3 TIMES DAILY PRN
Qty: 20 TABLET | Refills: 0 | Status: SHIPPED | OUTPATIENT
Start: 2023-08-05 | End: 2023-08-11

## 2023-08-05 RX ORDER — IBUPROFEN 200 MG
600 TABLET ORAL EVERY 8 HOURS PRN
Qty: 60 TABLET | Refills: 0 | Status: SHIPPED | OUTPATIENT
Start: 2023-08-05

## 2023-08-05 RX ADMIN — DIAZEPAM 5 MG: 5 TABLET ORAL at 01:39

## 2023-08-05 RX ADMIN — OXYCODONE HYDROCHLORIDE AND ACETAMINOPHEN 1 TABLET: 5; 325 TABLET ORAL at 03:04

## 2023-08-05 ASSESSMENT — ACTIVITIES OF DAILY LIVING (ADL)
ADLS_ACUITY_SCORE: 36
ADLS_ACUITY_SCORE: 36

## 2023-08-05 NOTE — ED PROVIDER NOTES
"  History     Chief Complaint:  Back Pain     The history is provided by the patient.      Liam Cintron is a 38 year old female who presents with back pain. Patient states that she she has been feeling pain in her right-sided lower back that migrates to her RLQ and buttocks. Patient states that the pain also migrates to her right leg and is associated with bilateral numbness and tingling. She expresses not being able to sit or sleep due to the pain, and that she notices the pain when she is moving or bending down to pray.  Patient denies blood in her urine and stool but has felt an aching pain and bump in her rectum for a few week. Patient expresses she is able to walk but endorses overall weakness. Patient says that her last period was in May and expresses possibility of pregnancy. She endorses slight constipation a few weeks ago but notes that it has since resolved. Denies  problems.     Independent Historian:   None - Patient Only          Medications:    cholecalciferol   co-enzyme   cyclobenzaprine   gabapentin   nortriptyline   omeprazole    Past Medical History:    GERD  Migraine  Brain aneurysm  Post partum hemorrhage  Ptyalism  Chronic neck pain  Female infertility  Hyperemesis  Chronic pain syndrome  Back pain  Thyroid disease  Cervical spondylosis   Disorder of jaw     Past Surgical History:    UPPER GASTROINTESTINAL ENDOSCOPY   CIRCUMCISION   WISDOM TEETH EXTRACTION     Physical Exam   Patient Vitals for the past 24 hrs:   BP Temp Temp src Pulse Resp SpO2 Height Weight   08/05/23 0050 103/59 97.6  F (36.4  C) Oral 56 20 100 % 1.626 m (5' 4\") 66.7 kg (147 lb)      Physical Exam  Constitutional:  Oriented to person, place, and time. Well appearing.   HENT:   Head:    Normocephalic.   Mouth/Throat:   Oropharynx is clear and moist.   Eyes:    EOM are normal. Pupils are equal, round, and reactive to light.   Neck:    Neck supple.   Cardiovascular:  Normal rate, regular rhythm and normal heart sounds. "      Exam reveals no gallop and no friction rub.       No murmur heard.  Pulmonary/Chest:  Effort normal and breath sounds normal.      No respiratory distress. No wheezes. No rales.      No reproducible chest wall pain.  Abdominal:   Soft. No distension. No tenderness. No rebound and no guarding.   :   Small external hemorrhoid palpable non-thrombose. No perrectal or abscess noted.   Musculoskeletal:  Normal range of motion. No pain or repercussion over flanks. No midline tenrderness. Right lumbar keeley spinal noted.    2+ dorsalis pedis pulse. Straight leg raise negative.  Neurological:   Alert and oriented to person, place, and time. 5/5 strength in all 4 extremities.  Sensation intact to light touch in all 4 extremities.           Moves all 4 extremities spontaneously    Skin:    No rash noted. No pallor.      Emergency Department Course   Imaging:  No orders to display      Report per radiology    Laboratory:  Labs Ordered and Resulted from Time of ED Arrival to Time of ED Departure   HCG QUALITATIVE URINE - Normal       Result Value    hCG Urine Qualitative Negative     ROUTINE UA WITH MICROSCOPIC REFLEX TO CULTURE        Procedures   None    Emergency Department Course & Assessments:         Interventions:  Medications - No data to display     Assessments:  0113 I obtained history and examined the patient as noted above.   0224 I updated the patient on her imaging results  Independent Interpretation (X-rays, CTs, rhythm strip):  None    Consultations/Discussion of Management or Tests:        Social Determinants of Health affecting care:   None    Disposition:  The patient was discharged to home.     Impression & Plan    Medical Decision Making:  Liam Cintron is a 38 year old female who presents for evaluation of back pain.  Differential includes muscular low back pain, renal colic, pyelonephritis, ectopic pregnancy, ovarian cyst, ovarian torsion, or other causes.  Initial UA screening was reassuring.  She  has been complaining of intermittent right lower quadrant pain referred from her back for the past 2 weeks.  Pain is nonreproducible.  I would highly doubt acute appendicitis or surgical process after CT imaging was obtained.  Ultrasound imaging shows a left-sided ovarian cyst which is likely incidental.  Pain is clearly worsened with movement and flexion extension of her back suggesting muscular origin.  She has no weakness or numbness or loss control bladder bowel no concerns for cauda equina or neurologic compromise.  Therefore MRI imaging is not indicated.  She will be discharged home with Motrin Flexeril Medrol Dosepak is told to follow-up with physical therapy and return for worsening pain abdominal pain vomiting weakness numbness loss control bladder or bowel any new symptoms or concerns.  Diagnosis:    ICD-10-CM    1. Acute low back pain, unspecified back pain laterality, unspecified whether sciatica present  M54.50       2. Left ovarian cyst  N83.202            Discharge Medications:  Discharge Medication List as of 8/5/2023  3:07 AM        START taking these medications    Details   !! cyclobenzaprine (FLEXERIL) 10 MG tablet Take 1 tablet (10 mg) by mouth 3 times daily as needed for muscle spasms, Disp-20 tablet, R-0, E-Prescribe      ibuprofen (ADVIL/MOTRIN) 200 MG tablet Take 3 tablets (600 mg) by mouth every 8 hours as needed for mild pain, Disp-60 tablet, R-0, E-Prescribe      methylPREDNISolone (MEDROL DOSEPAK) 4 MG tablet therapy pack Take 1 tablet (4 mg) by mouth See Admin Instructions, Disp-21 tablet, R-0, E-Prescribe       !! - Potential duplicate medications found. Please discuss with provider.         Scribe Disclosure:  I, Murali Ivory, am serving as a scribe at 2:07 AM on 8/5/2023 to document services personally performed by Robert Cobos MD based on my observations and the provider's statements to me.     8/5/2023   Robert Cobos MD Shapin, Ryan P, MD  08/05/23 2990

## 2023-08-05 NOTE — ED NOTES
Pt complaining of a bruise and bump at the site of blood drawn. Advise to maintain the pressure a little longer. Wrapped with coban this time and ice applied.

## 2023-08-05 NOTE — DISCHARGE INSTRUCTIONS
Discharge Instructions  Back Pain  You were seen today for back pain. Back pain can have many causes, but most will get better without surgery or other specific treatment. Sometimes there is a herniated ( slipped ) disc. We do not usually do MRI scans to look for these right away, since most herniated discs will get better on their own with time.  Today, we did not find any evidence that your back pain was caused by a serious condition. However, sometimes symptoms develop over time and cannot be found during an emergency visit, so it is very important that you follow up with your primary provider.  Generally, every Emergency Department visit should have a follow-up clinic visit with either a primary or a specialty clinic/provider. Please follow-up as instructed by your emergency provider today.    Return to the Emergency Department if:  You develop a fever with your back pain.   You have weakness or change in sensation in one or both legs.  You lose control of your bowels or bladder, or cannot empty your bladder (cannot pee).  Your pain gets much worse.     Follow-up with your provider:  Unless your pain has completely gone away, please make an appointment with your provider within one week. Most of the routine care for back pain is available in a clinic and not the Emergency Department. You may need further management of your back pain, such as more pain medication, imaging such as an X-ray or MRI, or physical therapy.    What can I do to help myself?  Remain Active -- People are often afraid that they will hurt their back further or delay recovery by remaining active, but this is one of the best things you can do for your back. In fact, staying in bed for a long time to rest is not recommended. Studies have shown that people with low back pain recover faster when they remain active. Movement helps to bring blood flow to the muscles and relieve muscle spasms as well as preventing loss of muscle strength.  Heat --  Using a heating pad can help with low back pain during the first few weeks. Do not sleep with a heating pad, as you can be burned.   Pain medications - You may take a pain medication such as Tylenol  (acetaminophen), Advil , Motrin  (ibuprofen) or Aleve  (naproxen).  If you were given a prescription for medicine here today, be sure to read all of the information (including the package insert) that comes with your prescription.  This will include important information about the medicine, its side effects, and any warnings that you need to know about.  The pharmacist who fills the prescription can provide more information and answer questions you may have about the medicine.  If you have questions or concerns that the pharmacist cannot address, please call or return to the Emergency Department.   Remember that you can always come back to the Emergency Department if you are not able to see your regular provider in the amount of time listed above, if you get any new symptoms, or if there is anything that worries you.  Discharge Instructions  Abdominal Pain    Abdominal pain (belly pain) can be caused by many things. Your evaluation today does not show the exact cause for your pain. Your provider today has decided that it is unlikely your pain is due to a life threatening problem, or a problem requiring surgery or hospital admission. Sometimes those problems cannot be found right away, so it is very important that you follow up as directed.  Sometimes only the changes which occur over time allow the cause of your pain to be found.    Generally, every Emergency Department visit should have a follow-up clinic visit with either a primary or a specialty clinic/provider. Please follow-up as instructed by your emergency provider today. With abdominal pain, we often recommend very close follow-up, such as the following day.    ADULTS:  Return to the Emergency Department right away if:    You get an oral temperature above 102oF  or as directed by your provider.  You have blood in your stools. This may be bright red or appear as black, tarry stools.    You keep vomiting (throwing up) or cannot drink liquids.  You see blood when you vomit.   You cannot have a bowel movement or you cannot pass gas.  Your stomach gets bloated or bigger.  Your skin or the whites of your eyes look yellow.  You faint.  You have bloody, frequent or painful urination (peeing).  You have new symptoms or anything that worries you.    CHILDREN:  Return to the Emergency Department right away if your child has any of the above-listed symptoms or the following:    Pushes your hand away or screams/cries when his/her belly is touched.  You notice your child is very fussy or weak.  Your child is very tired and is too tired to eat or drink.  Your child is dehydrated.  Signs of dehydration can be:  Significant change in the amount of wet diapers/urine.  Your infant or child starts to have dry mouth and lips, or no saliva (spit) or tears.    PREGNANT WOMEN:  Return to the Emergency Department right away if you have any of the above-listed symptoms or the following:    You have bleeding, leaking fluid or passing tissue from the vagina.  You have worse pain or cramping, or pain in your shoulder or back.  You have vomiting that will not stop.  You have a temperature of 100oF or more.  Your baby is not moving as much as usual.  You faint.  You get a bad headache with or without eye problems and abdominal pain.  You have a seizure.  You have unusual discharge from your vagina and abdominal pain.    Abdominal pain is pretty common during pregnancy.  Your pain may or may not be related to your pregnancy. You should follow-up closely with your OB provider so they can evaluate you and your baby.  Until you follow-up with your regular provider, do the following:     Avoid sex and do not put anything in your vagina.  Drink clear fluids.  Only take medications approved by your  "provider.    MORE INFORMATION:    Appendicitis:  A possible cause of abdominal pain in any person who still has their appendix is acute appendicitis. Appendicitis is often hard to diagnose.  Testing does not always rule out early appendicitis or other causes of abdominal pain. Close follow-up with your provider and re-evaluations may be needed to figure out the reason for your abdominal pain.    Follow-up:  It is very important that you make an appointment with your clinic and go to the appointment.  If you do not follow-up with your primary provider, it may result in missing an important development which could result in permanent injury or disability and/or lasting pain.  If there is any problem keeping your appointment, call your provider or return to the Emergency Department.    Medications:  Take your medications as directed by your provider today.  Before using over-the-counter medications, ask your provider and make sure to take the medications as directed.  If you have any questions about medications, ask your provider.    Diet:  Resume your normal diet as much as possible, but do not eat fried, fatty or spicy foods while you have pain.  Do not drink alcohol or have caffeine.  Do not smoke tobacco.    Probiotics: If you have been given an antibiotic, you may want to also take a probiotic pill or eat yogurt with live cultures. Probiotics have \"good bacteria\" to help your intestines stay healthy. Studies have shown that probiotics help prevent diarrhea (loose stools) and other intestine problems (including C. diff infection) when you take antibiotics. You can buy these without a prescription in the pharmacy section of the store.     If you were given a prescription for medicine here today, be sure to read all of the information (including the package insert) that comes with your prescription.  This will include important information about the medicine, its side effects, and any warnings that you need to know " about.  The pharmacist who fills the prescription can provide more information and answer questions you may have about the medicine.  If you have questions or concerns that the pharmacist cannot address, please call or return to the Emergency Department.       Remember that you can always come back to the Emergency Department if you are not able to see your regular provider in the amount of time listed above, if you get any new symptoms, or if there is anything that worries you.

## 2024-01-10 ENCOUNTER — TRANSFERRED RECORDS (OUTPATIENT)
Dept: HEALTH INFORMATION MANAGEMENT | Facility: CLINIC | Age: 39
End: 2024-01-10
Payer: COMMERCIAL

## 2024-01-14 ENCOUNTER — MEDICAL CORRESPONDENCE (OUTPATIENT)
Dept: HEALTH INFORMATION MANAGEMENT | Facility: CLINIC | Age: 39
End: 2024-01-14
Payer: COMMERCIAL

## 2024-01-15 ENCOUNTER — TRANSCRIBE ORDERS (OUTPATIENT)
Dept: OTHER | Age: 39
End: 2024-01-15

## 2024-01-15 DIAGNOSIS — M54.9 UPPER BACK PAIN: ICD-10-CM

## 2024-01-15 DIAGNOSIS — M25.551 PAIN IN RIGHT HIP: Primary | ICD-10-CM
